# Patient Record
Sex: MALE | Race: BLACK OR AFRICAN AMERICAN | Employment: UNEMPLOYED | URBAN - METROPOLITAN AREA
[De-identification: names, ages, dates, MRNs, and addresses within clinical notes are randomized per-mention and may not be internally consistent; named-entity substitution may affect disease eponyms.]

---

## 2021-07-08 RX ORDER — HYDRALAZINE HYDROCHLORIDE 50 MG/1
50 TABLET, FILM COATED ORAL 3 TIMES DAILY
COMMUNITY

## 2021-07-08 RX ORDER — HYDROXYZINE 25 MG/1
25 TABLET, FILM COATED ORAL
COMMUNITY

## 2021-07-08 RX ORDER — AMLODIPINE BESYLATE 10 MG/1
10 TABLET ORAL EVERY EVENING
COMMUNITY

## 2021-07-08 RX ORDER — CLONIDINE HYDROCHLORIDE 0.2 MG/1
0.2 TABLET ORAL 3 TIMES DAILY
COMMUNITY

## 2021-07-08 RX ORDER — PRAVASTATIN SODIUM 10 MG/1
10 TABLET ORAL
COMMUNITY

## 2021-07-08 RX ORDER — FUROSEMIDE 80 MG/1
80 TABLET ORAL DAILY
COMMUNITY

## 2021-07-08 NOTE — PROGRESS NOTES
46- Dr. Axel Hess office called spoke with Leia Omer, office staff made aware that patient stated during phone assessment he knew he had an appointment for a cardiac cath but he is unsure of why he is having the procedure. She stated she will send a message through to the nurse Lorin Mosley) and have them call the patient to answer his questions. Stated will call PAT with any further information after speaking with the patient. Anabel Beyer from Dr. Axel Hess office called as well made aware of above information she stated she will call the patient. Patient was informed during phone assessment that the office would reach out to him to answer his questions.

## 2021-08-05 ENCOUNTER — HOSPITAL ENCOUNTER (OUTPATIENT)
Age: 51
Discharge: HOME | End: 2021-08-05
Attending: INTERNAL MEDICINE | Admitting: INTERNAL MEDICINE
Payer: MEDICARE

## 2021-08-05 VITALS
RESPIRATION RATE: 16 BRPM | SYSTOLIC BLOOD PRESSURE: 147 MMHG | OXYGEN SATURATION: 99 % | HEART RATE: 85 BPM | DIASTOLIC BLOOD PRESSURE: 92 MMHG | TEMPERATURE: 98.1 F | BODY MASS INDEX: 24.64 KG/M2 | WEIGHT: 192 LBS | HEIGHT: 74 IN

## 2021-08-05 DIAGNOSIS — R94.39 ABNORMAL STRESS ECG: ICD-10-CM

## 2021-08-05 LAB
ALBUMIN SERPL-MCNC: 3.7 G/DL (ref 3.5–5)
ALBUMIN/GLOB SERPL: 0.7 {RATIO} (ref 1.1–2.2)
ALP SERPL-CCNC: 99 U/L (ref 45–117)
ALT SERPL-CCNC: 17 U/L (ref 12–78)
ANION GAP SERPL CALC-SCNC: 4 MMOL/L (ref 5–15)
APTT PPP: 28.6 SEC (ref 21.2–34.1)
AST SERPL W P-5'-P-CCNC: 17 U/L (ref 15–37)
ATRIAL RATE: 81 BPM
BILIRUB SERPL-MCNC: 0.5 MG/DL (ref 0.2–1)
BUN SERPL-MCNC: 23 MG/DL (ref 6–20)
BUN/CREAT SERPL: 5 (ref 12–20)
CA-I BLD-MCNC: 9.9 MG/DL (ref 8.5–10.1)
CALCULATED P AXIS, ECG09: 48 DEGREES
CALCULATED R AXIS, ECG10: -8 DEGREES
CALCULATED T AXIS, ECG11: 20 DEGREES
CHLORIDE SERPL-SCNC: 101 MMOL/L (ref 97–108)
CO2 SERPL-SCNC: 33 MMOL/L (ref 21–32)
CREAT SERPL-MCNC: 5.1 MG/DL (ref 0.7–1.3)
DIAGNOSIS, 93000: NORMAL
ERYTHROCYTE [DISTWIDTH] IN BLOOD BY AUTOMATED COUNT: 17.4 % (ref 11.5–14.5)
GLOBULIN SER CALC-MCNC: 5 G/DL (ref 2–4)
GLUCOSE SERPL-MCNC: 99 MG/DL (ref 65–100)
HCT VFR BLD AUTO: 32.4 % (ref 36.6–50.3)
HGB BLD-MCNC: 10.8 G/DL (ref 12.1–17)
INR PPP: 0.9 (ref 0.9–1.1)
MCH RBC QN AUTO: 25.9 PG (ref 26–34)
MCHC RBC AUTO-ENTMCNC: 33.3 G/DL (ref 30–36.5)
MCV RBC AUTO: 77.7 FL (ref 80–99)
NRBC # BLD: 0 K/UL (ref 0–0.01)
NRBC BLD-RTO: 0 PER 100 WBC
P-R INTERVAL, ECG05: 184 MS
PLATELET # BLD AUTO: 186 K/UL (ref 150–400)
POTASSIUM SERPL-SCNC: 4.4 MMOL/L (ref 3.5–5.1)
PROT SERPL-MCNC: 8.7 G/DL (ref 6.4–8.2)
PROTHROMBIN TIME: 12 SEC (ref 11.9–14.7)
Q-T INTERVAL, ECG07: 390 MS
QRS DURATION, ECG06: 96 MS
QTC CALCULATION (BEZET), ECG08: 453 MS
RBC # BLD AUTO: 4.17 M/UL (ref 4.1–5.7)
SODIUM SERPL-SCNC: 138 MMOL/L (ref 136–145)
THERAPEUTIC RANGE,PTTT: NORMAL SEC (ref 82–109)
VENTRICULAR RATE, ECG03: 81 BPM
WBC # BLD AUTO: 6.5 K/UL (ref 4.1–11.1)

## 2021-08-05 PROCEDURE — 93005 ELECTROCARDIOGRAM TRACING: CPT

## 2021-08-05 PROCEDURE — 36415 COLL VENOUS BLD VENIPUNCTURE: CPT

## 2021-08-05 PROCEDURE — 85610 PROTHROMBIN TIME: CPT

## 2021-08-05 PROCEDURE — 74011250636 HC RX REV CODE- 250/636: Performed by: INTERNAL MEDICINE

## 2021-08-05 PROCEDURE — 85730 THROMBOPLASTIN TIME PARTIAL: CPT

## 2021-08-05 PROCEDURE — 85027 COMPLETE CBC AUTOMATED: CPT

## 2021-08-05 PROCEDURE — 80053 COMPREHEN METABOLIC PANEL: CPT

## 2021-08-05 RX ORDER — SODIUM CHLORIDE 9 MG/ML
30 INJECTION, SOLUTION INTRAVENOUS CONTINUOUS
Status: DISCONTINUED | OUTPATIENT
Start: 2021-08-05 | End: 2021-08-05 | Stop reason: HOSPADM

## 2021-08-05 RX ADMIN — SODIUM CHLORIDE 30 ML/HR: 9 INJECTION, SOLUTION INTRAVENOUS at 12:15

## 2021-09-13 ENCOUNTER — HOSPITAL ENCOUNTER (OUTPATIENT)
Dept: PREADMISSION TESTING | Age: 51
Discharge: HOME | End: 2021-09-13
Payer: MEDICARE

## 2021-09-13 VITALS
DIASTOLIC BLOOD PRESSURE: 83 MMHG | RESPIRATION RATE: 16 BRPM | TEMPERATURE: 97.9 F | OXYGEN SATURATION: 100 % | SYSTOLIC BLOOD PRESSURE: 136 MMHG | WEIGHT: 193.8 LBS | BODY MASS INDEX: 24.87 KG/M2 | HEIGHT: 74 IN | HEART RATE: 79 BPM

## 2021-09-13 LAB
ANION GAP SERPL CALC-SCNC: 4 MMOL/L (ref 5–15)
APTT PPP: 28.8 SEC (ref 21.2–34.1)
BUN SERPL-MCNC: 10 MG/DL (ref 6–20)
BUN/CREAT SERPL: 3 (ref 12–20)
CA-I BLD-MCNC: 9.3 MG/DL (ref 8.5–10.1)
CHLORIDE SERPL-SCNC: 100 MMOL/L (ref 97–108)
CO2 SERPL-SCNC: 33 MMOL/L (ref 21–32)
CREAT SERPL-MCNC: 3.51 MG/DL (ref 0.7–1.3)
ERYTHROCYTE [DISTWIDTH] IN BLOOD BY AUTOMATED COUNT: 16.8 % (ref 11.5–14.5)
GLUCOSE SERPL-MCNC: 87 MG/DL (ref 65–100)
HCT VFR BLD AUTO: 31.1 % (ref 36.6–50.3)
HGB BLD-MCNC: 10.6 G/DL (ref 12.1–17)
INR PPP: 0.9 (ref 0.9–1.1)
MCH RBC QN AUTO: 25.5 PG (ref 26–34)
MCHC RBC AUTO-ENTMCNC: 34.1 G/DL (ref 30–36.5)
MCV RBC AUTO: 74.9 FL (ref 80–99)
NRBC # BLD: 0 K/UL (ref 0–0.01)
NRBC BLD-RTO: 0 PER 100 WBC
PLATELET # BLD AUTO: 165 K/UL (ref 150–400)
POTASSIUM SERPL-SCNC: 4 MMOL/L (ref 3.5–5.1)
PROTHROMBIN TIME: 11.5 SEC (ref 11.9–14.7)
RBC # BLD AUTO: 4.15 M/UL (ref 4.1–5.7)
SODIUM SERPL-SCNC: 137 MMOL/L (ref 136–145)
THERAPEUTIC RANGE,PTTT: NORMAL SEC (ref 82–109)
WBC # BLD AUTO: 4.5 K/UL (ref 4.1–11.1)

## 2021-09-13 PROCEDURE — 85610 PROTHROMBIN TIME: CPT

## 2021-09-13 PROCEDURE — 80048 BASIC METABOLIC PNL TOTAL CA: CPT

## 2021-09-13 PROCEDURE — 85730 THROMBOPLASTIN TIME PARTIAL: CPT

## 2021-09-13 PROCEDURE — 36415 COLL VENOUS BLD VENIPUNCTURE: CPT

## 2021-09-13 PROCEDURE — 85027 COMPLETE CBC AUTOMATED: CPT

## 2021-09-13 NOTE — PROGRESS NOTES
[facility]-[address] [place] [address] Cardiology, spoke with Steffany Uriostegui, listed all abnormal lab values, she states she would let NP at office know and call back with any new orders. No new orders at this time.

## 2021-09-16 ENCOUNTER — HOSPITAL ENCOUNTER (OUTPATIENT)
Age: 51
Discharge: HOME | End: 2021-09-16
Attending: INTERNAL MEDICINE | Admitting: INTERNAL MEDICINE
Payer: MEDICARE

## 2021-09-16 VITALS
SYSTOLIC BLOOD PRESSURE: 159 MMHG | HEIGHT: 74 IN | BODY MASS INDEX: 24.87 KG/M2 | DIASTOLIC BLOOD PRESSURE: 93 MMHG | WEIGHT: 193.8 LBS | RESPIRATION RATE: 18 BRPM | TEMPERATURE: 98.4 F | OXYGEN SATURATION: 100 % | HEART RATE: 77 BPM

## 2021-09-16 DIAGNOSIS — R07.89 OTHER CHEST PAIN: ICD-10-CM

## 2021-09-16 PROBLEM — R06.02 SOB (SHORTNESS OF BREATH): Status: ACTIVE | Noted: 2021-09-16

## 2021-09-16 LAB
APPEARANCE UR: CLEAR
BACTERIA URNS QL MICRO: NEGATIVE /HPF
BILIRUB UR QL: NEGATIVE
COLOR UR: ABNORMAL
GLUCOSE UR STRIP.AUTO-MCNC: NEGATIVE MG/DL
HGB UR QL STRIP: ABNORMAL
KETONES UR QL STRIP.AUTO: NEGATIVE MG/DL
LEUKOCYTE ESTERASE UR QL STRIP.AUTO: NEGATIVE
NITRITE UR QL STRIP.AUTO: NEGATIVE
PH UR STRIP: 8 [PH] (ref 5–8)
PROT UR STRIP-MCNC: 30 MG/DL
RBC #/AREA URNS HPF: ABNORMAL /HPF (ref 0–5)
SP GR UR REFRACTOMETRY: <1.005 (ref 1–1.03)
UA: UC IF INDICATED,UAUC: ABNORMAL
UROBILINOGEN UR QL STRIP.AUTO: 0.1 EU/DL (ref 0.1–1)
WBC URNS QL MICRO: ABNORMAL /HPF (ref 0–4)

## 2021-09-16 PROCEDURE — 76210000021 HC REC RM PH II 0.5 TO 1 HR: Performed by: INTERNAL MEDICINE

## 2021-09-16 PROCEDURE — 77030019569 HC BND COMPR RAD TERU -B: Performed by: INTERNAL MEDICINE

## 2021-09-16 PROCEDURE — 77030019698 HC SYR ANGI MDLON MRTM -A: Performed by: INTERNAL MEDICINE

## 2021-09-16 PROCEDURE — 74011000250 HC RX REV CODE- 250: Performed by: INTERNAL MEDICINE

## 2021-09-16 PROCEDURE — 2709999900 HC NON-CHARGEABLE SUPPLY: Performed by: INTERNAL MEDICINE

## 2021-09-16 PROCEDURE — 81001 URINALYSIS AUTO W/SCOPE: CPT

## 2021-09-16 PROCEDURE — C1894 INTRO/SHEATH, NON-LASER: HCPCS | Performed by: INTERNAL MEDICINE

## 2021-09-16 PROCEDURE — 74011000636 HC RX REV CODE- 636: Performed by: INTERNAL MEDICINE

## 2021-09-16 PROCEDURE — 77030016699 HC CATH ANGI DX INFN1 CARD -A: Performed by: INTERNAL MEDICINE

## 2021-09-16 PROCEDURE — 99152 MOD SED SAME PHYS/QHP 5/>YRS: CPT | Performed by: INTERNAL MEDICINE

## 2021-09-16 PROCEDURE — C1769 GUIDE WIRE: HCPCS | Performed by: INTERNAL MEDICINE

## 2021-09-16 PROCEDURE — 93458 L HRT ARTERY/VENTRICLE ANGIO: CPT | Performed by: INTERNAL MEDICINE

## 2021-09-16 PROCEDURE — 76937 US GUIDE VASCULAR ACCESS: CPT | Performed by: INTERNAL MEDICINE

## 2021-09-16 PROCEDURE — 76210000017 HC OR PH I REC 1.5 TO 2 HR: Performed by: INTERNAL MEDICINE

## 2021-09-16 PROCEDURE — 74011250636 HC RX REV CODE- 250/636: Performed by: INTERNAL MEDICINE

## 2021-09-16 RX ORDER — HEPARIN SODIUM 200 [USP'U]/100ML
INJECTION, SOLUTION INTRAVENOUS
Status: COMPLETED | OUTPATIENT
Start: 2021-09-16 | End: 2021-09-16

## 2021-09-16 RX ORDER — LIDOCAINE HYDROCHLORIDE 10 MG/ML
INJECTION INFILTRATION; PERINEURAL AS NEEDED
Status: DISCONTINUED | OUTPATIENT
Start: 2021-09-16 | End: 2021-09-16 | Stop reason: HOSPADM

## 2021-09-16 RX ORDER — HYDRALAZINE HYDROCHLORIDE 20 MG/ML
10 INJECTION INTRAMUSCULAR; INTRAVENOUS
Status: COMPLETED | OUTPATIENT
Start: 2021-09-16 | End: 2021-09-16

## 2021-09-16 RX ORDER — MIDAZOLAM HYDROCHLORIDE 1 MG/ML
INJECTION INTRAMUSCULAR; INTRAVENOUS AS NEEDED
Status: DISCONTINUED | OUTPATIENT
Start: 2021-09-16 | End: 2021-09-16 | Stop reason: HOSPADM

## 2021-09-16 RX ORDER — MORPHINE SULFATE 2 MG/ML
INJECTION, SOLUTION INTRAMUSCULAR; INTRAVENOUS AS NEEDED
Status: DISCONTINUED | OUTPATIENT
Start: 2021-09-16 | End: 2021-09-16 | Stop reason: HOSPADM

## 2021-09-16 RX ORDER — HEPARIN SODIUM 1000 [USP'U]/ML
INJECTION, SOLUTION INTRAVENOUS; SUBCUTANEOUS AS NEEDED
Status: DISCONTINUED | OUTPATIENT
Start: 2021-09-16 | End: 2021-09-16 | Stop reason: HOSPADM

## 2021-09-16 RX ORDER — SODIUM CHLORIDE 0.9 % (FLUSH) 0.9 %
5-40 SYRINGE (ML) INJECTION AS NEEDED
Status: DISCONTINUED | OUTPATIENT
Start: 2021-09-16 | End: 2021-09-16 | Stop reason: HOSPADM

## 2021-09-16 RX ORDER — SODIUM CHLORIDE 0.9 % (FLUSH) 0.9 %
5-40 SYRINGE (ML) INJECTION EVERY 8 HOURS
Status: DISCONTINUED | OUTPATIENT
Start: 2021-09-16 | End: 2021-09-16 | Stop reason: HOSPADM

## 2021-09-16 RX ORDER — VERAPAMIL HYDROCHLORIDE 2.5 MG/ML
INJECTION, SOLUTION INTRAVENOUS AS NEEDED
Status: DISCONTINUED | OUTPATIENT
Start: 2021-09-16 | End: 2021-09-16 | Stop reason: HOSPADM

## 2021-09-16 RX ORDER — NITROGLYCERIN 5 MG/ML
INJECTION, SOLUTION INTRAVENOUS AS NEEDED
Status: DISCONTINUED | OUTPATIENT
Start: 2021-09-16 | End: 2021-09-16 | Stop reason: HOSPADM

## 2021-09-16 RX ORDER — SODIUM CHLORIDE 9 MG/ML
30 INJECTION, SOLUTION INTRAVENOUS CONTINUOUS
Status: DISCONTINUED | OUTPATIENT
Start: 2021-09-16 | End: 2021-09-16 | Stop reason: HOSPADM

## 2021-09-16 RX ADMIN — SODIUM CHLORIDE 30 ML/HR: 9 INJECTION, SOLUTION INTRAVENOUS at 08:42

## 2021-09-16 RX ADMIN — HYDRALAZINE HYDROCHLORIDE 10 MG: 20 INJECTION INTRAMUSCULAR; INTRAVENOUS at 11:02

## 2021-09-16 NOTE — DISCHARGE INSTRUCTIONS
Patient Education   Patient Education                    Cardiac Catheterization/Angiography Discharge Instructions    *Check the puncture site frequently for swelling or bleeding. If you see any bleeding, lie down and apply pressure over the area with a clean town or washcloth. Notify your doctor for any redness, swelling, drainage or oozing from the puncture site. Notify your doctor for any fever or chills. *If the leg or arm with the puncture becomes cold, numb or painful, call Dr Campos Breen at  [facility]    *Activity should be limited for the next 48 hours. Climb stairs as little as possible and avoid any stooping, bending or strenuous activity for 48 hours. No heavy lifting (anything over 10 pounds) for three days. *Do not drive for 48 hours. *You may resume your usual diet. Drink more fluids than usual.    *Have a responsible person drive you home and stay with you for at least 24 hours after your heart catheterization/angiography. *You may remove the bandage from your {ARM/GROIN:60447} in 24 hours. You may shower in 24 hours. No tub baths, hot tubs or swimming for one week. Do not place any lotions, creams, powders, ointments over the puncture site for one week. You may place a clean band-aid over the puncture site each day for 5 days. Change this daily. Learning About Monitored Anesthesia Care (MAC)  What is monitored anesthesia care? Monitored anesthesia care (MAC) means that an anesthesia specialist will care for you during your surgery. He or she will make sure that you get only the level of anesthesia you need to prevent pain for your specific case. MAC can be used instead of always giving general or regional anesthesia. MAC is often combined with local anesthesia for smaller procedures. That means you will be numb in the area being operated on. Sedation with MAC can make you feel sleepy and relaxed.  A doctor or a nurse who is trained to give anesthesia will closely watch you during surgery. He or she may adjust the medicine if needed so that you stay safe and comfortable. You may not remember much about the surgery after. There may be fewer side effects than with other types of anesthesia. It can also help you recover more quickly. What happens before surgery? An anesthesia specialist will talk to you before the surgery. He or she might ask about your health, past surgeries, medicine you take, and your family history, and your feelings about the surgery. He or she will help you decide if MAC is right for you. You will get an IV, which lets medicine into your vein through a tube. You may get:  · A sedative or anxiety medicine to help you relax. · Pain medicine. It can prevent pain during the surgery. You might also get a shot to make the area near the surgery numb. You will get a list of things to do to help you prepare for your surgery. You will learn about when to stop eating and drinking. If you take medicine, you will learn what you can and can't take before surgery. You will be asked to sign a form that says you understand the risks of anesthesia. Before you sign it, your doctor will talk with you about sedation with MAC. He or she may talk about other types of anesthesia as well. You will learn about the risks and benefits of each type. You may be told that the doctor may need to change from MAC to general anesthesia during surgery to keep you comfortable and safe. Many people are nervous before they have surgery. Ask your doctor about ways to relax. These may include relaxation exercises or medicine. What are the risks of anesthesia? Major side effects aren't common. But all types of anesthesia have some risk. Your risk depends on your overall health. It also depends on the type of anesthesia you have and how you respond to it. Serious but rare risks include breathing problems, heart attack, stroke, and a bad reaction to the medicine.  Some health conditions increase the risk of problems. Your doctor will find out about any health problems you have that may affect your care. Your doctor or nurse will closely watch your vital signs during surgery. This includes checking your breathing, blood pressure, and heart rate. This may help you avoid problems from anesthesia. What can you expect after having MAC? · Right after the surgery, you will be in the recovery room. Nurses will make sure you are safe and comfortable. · You may feel some of the effects of sedation with MAC for several hours. ? If you had local or regional anesthesia, you may feel numb and have less feeling in part of your body. It may also take a few hours for you to be able to move and control your muscles as usual.  ? If you had local anesthesia along with MAC, you may feel some pain and discomfort as the anesthetic wears off. Tell someone if you have pain. Pain medicine works better if you take it before the pain gets bad.  ? If your sedation with MAC was switched to general anesthesia during surgery, you may be confused. Or it may be hard to think clearly. This is normal. It may take some time before the effects are completely gone. · For minor surgeries, you may go home the same day. For other surgeries, you may stay in the hospital. Your doctor will check on your recovery from the anesthesia. Your doctor will answer any questions you may have. · Don't do anything for 24 hours that requires attention to detail. This includes going to work or school, making important decisions, and signing any legal documents. It takes time for the medicine effects to completely wear off. · For your safety, do not drive or operate any machinery that could be dangerous until the medicine wears off and you can think clearly and react easily. Follow-up care is a key part of your treatment and safety. Be sure to make and go to all appointments, and call your doctor if you are having problems.  It's also a good idea to know your test results and keep a list of the medicines you take. Where can you learn more? Go to http://www.gray.com/  Enter R730 in the search box to learn more about \"Learning About Monitored Anesthesia Care (MAC). \"  Current as of: February 11, 2021               Content Version: 13.0  © 4407-1880 [employer]. Care instructions adapted under license by [employer] (which disclaims liability or warranty for this information). If you have questions about a medical condition or this instruction, always ask your healthcare professional. [address] any warranty or liability for your use of this information. Angiogram: What to Expect at Home  Your Recovery  An angiogram is an X-ray test that uses dye and a camera to take pictures of the blood flow in an artery or a vein. The doctor inserted a thin, flexible tube (catheter) into a blood vessel in your groin. In some cases, the catheter is placed in a blood vessel in the arm. An angiogram is done for many reasons. For example, you may have an angiogram to find the source of bleeding, such as an ulcer. Or it may be done to look for blocked blood vessels in your lungs. After an angiogram, your groin or arm may have a bruise and feel sore for a day or two. You can do light activities around the house but nothing strenuous for several days. Your doctor may give you specific instructions on when you can do your normal activities again, such as driving and going back to work. This care sheet gives you a general idea about how long it will take for you to recover. But each person recovers at a different pace. Follow the steps below to feel better as quickly as possible. How can you care for yourself at home? Activity    · Do not do strenuous exercise and do not lift, pull, or push anything heavy until your doctor says it is okay. This may be for a day or two.  You can walk around the house and do light activity, such as cooking.     · If the catheter was placed in your groin, try not to walk up stairs for the first couple of days.     · If the catheter was placed in your arm near your wrist, do not bend your wrist deeply for the first couple of days. Be careful using your hand to get into and out of a chair or bed.     · If your doctor recommends it, get more exercise. Walking is a good choice. Bit by bit, increase the amount you walk every day. Try for at least 30 minutes on most days of the week. Diet    · Drink plenty of fluids to help your body flush out the dye. If you have kidney, heart, or liver disease and have to limit fluids, talk with your doctor before you increase the amount of fluids you drink.     · You can eat your normal diet. If your stomach is upset, try bland, low-fat foods like plain rice, broiled chicken, toast, and yogurt. Medicines    · Be safe with medicines. Read and follow all instructions on the label. ? If the doctor gave you a prescription medicine for pain, take it as prescribed. ? If you are not taking a prescription pain medicine, ask your doctor if you can take an over-the-counter medicine.     · If you take aspirin or some other blood thinner, ask your doctor if and when to start taking it again. Make sure that you understand exactly what your doctor wants you to do.     · Your doctor will tell you if and when you can restart your medicines. He or she will also give you instructions about taking any new medicines. Care of the catheter site    · You will have a dressing over the cut (incision). A dressing helps the incision heal and protects it. Your doctor will tell you how to take care of this.     · Put ice or a cold pack on the area for 10 to 20 minutes at a time to help with soreness or swelling. Put a thin cloth between the ice and your skin.     · You may shower 24 to 48 hours after the procedure, if your doctor okays it.  Pat the incision dry.     · Do not soak the catheter site until it is healed. Don't take a bath for 1 week, or until your doctor tells you it is okay.     · Watch for bleeding from the site. A small amount of blood (up to the size of a quarter) on the bandage can be normal.     · If you are bleeding, lie down and press on the area for 15 minutes to try to make it stop. If the bleeding does not stop, call your doctor or seek immediate medical care. Follow-up care is a key part of your treatment and safety. Be sure to make and go to all appointments, and call your doctor if you are having problems. It's also a good idea to know your test results and keep a list of the medicines you take. When should you call for help? Call 911 anytime you think you may need emergency care. For example, call if:    · You passed out (lost consciousness).     · You have severe trouble breathing.     · You have sudden chest pain and shortness of breath, or you cough up blood. Call your doctor now or seek immediate medical care if:    · You are bleeding from the area where the catheter was put in your artery.     · You have a fast-growing, painful lump at the catheter site.     · You have signs of infection, such as:  ? Increased pain, swelling, warmth, or redness. ? Red streaks leading from the incision. ? Pus draining from the incision. ? A fever. Watch closely for any changes in your health, and be sure to contact your doctor if:    · You don't get better as expected. Where can you learn more? Go to http://www.gray.com/  Enter [id number] in the search box to learn more about \"Angiogram: What to Expect at Home. \"  Current as of: June 17, 2021               Content Version: 13.0  © 9149-7840 [employer]. Care instructions adapted under license by [employer] (which disclaims liability or warranty for this information).  If you have questions about a medical condition or this instruction, always ask your healthcare professional. [address] any warranty or liability for your use of this information.

## 2021-09-16 NOTE — Clinical Note
TRANSFER - OUT REPORT:     Verbal report given to: BONIFACIO Wallace, (at bedside). Report consisted of patient's Situation, Background, Assessment and   Recommendations(SBAR). Opportunity for questions and clarification was provided. Patient transported with a Registered Nurse. Patient transported to: PACU.

## 2021-09-16 NOTE — Clinical Note
Contrast Dose Calculator:   Patient's age: 53.   Patient's sex: Male. Patient weight (kg) = 87.9. Creatinine level (mg/dL) = 3.51. Creatinine clearance (mL/min): 32.   Contrast concentration (mg/mL) = 370. MACD = 101.52 mL. Max Contrast dose per Creatinine Cl calculator = 72 mL.

## 2021-09-16 NOTE — PROGRESS NOTES
DC instructions reviewed with pt. Verb an understanding. Rt wrist remains with dry dressing. 2+radial pulse. Pt offers no complaints.

## 2022-03-18 PROBLEM — R94.39 ABNORMAL CARDIOVASCULAR STRESS TEST: Status: ACTIVE | Noted: 2021-08-05

## 2022-03-19 PROBLEM — R06.02 SOB (SHORTNESS OF BREATH): Status: ACTIVE | Noted: 2021-09-16

## 2023-05-15 RX ORDER — PRAVASTATIN SODIUM 10 MG
10 TABLET ORAL NIGHTLY
COMMUNITY

## 2023-05-15 RX ORDER — CLONIDINE HYDROCHLORIDE 0.2 MG/1
0.2 TABLET ORAL 3 TIMES DAILY
COMMUNITY

## 2023-05-15 RX ORDER — FUROSEMIDE 80 MG
80 TABLET ORAL DAILY
COMMUNITY

## 2023-05-15 RX ORDER — AMLODIPINE BESYLATE 10 MG/1
10 TABLET ORAL EVERY EVENING
COMMUNITY

## 2023-05-15 RX ORDER — HYDROXYZINE HYDROCHLORIDE 25 MG/1
25 TABLET, FILM COATED ORAL NIGHTLY
COMMUNITY

## 2023-05-15 RX ORDER — HYDRALAZINE HYDROCHLORIDE 50 MG/1
50 TABLET, FILM COATED ORAL 3 TIMES DAILY
COMMUNITY

## (undated) DEVICE — 48" PROBE COVER W/GEL, ULTRASOUND, STERILE: Brand: SITE-RITE

## (undated) DEVICE — SURGICAL PROCEDURE TRAY CRD CATH 3 PRT

## (undated) DEVICE — SYRINGE MED 10ML PUR GAM COMPATIBLE POLYCARB FIX M LUER CONN

## (undated) DEVICE — CATHETER ANGIO 5FR L100CM GRY S STL NYL JL3.5 3 SEG BRAID L

## (undated) DEVICE — LULU RADIAL/FEMORAL ANGIOGRAPHY SHEET: Brand: CONVERTORS

## (undated) DEVICE — TUBING PRESS INJ FLX SH 30IN --

## (undated) DEVICE — CATH IV AUTOGRD PNK 20GA 25MM -- INSYTE-N

## (undated) DEVICE — GLIDESHEATH SLENDER ACCESS KIT: Brand: GLIDESHEATH SLENDER

## (undated) DEVICE — SYR 20ML LL STRL LF --

## (undated) DEVICE — WASTEBAG DRIP/ADAPTER: Brand: MEDLINE INDUSTRIES, INC.

## (undated) DEVICE — 3M™ TEGADERM™ TRANSPARENT FILM DRESSING FRAME STYLE, 1626W, 4 IN X 4-3/4 IN (10 CM X 12 CM), 50/CT 4CT/CASE: Brand: 3M™ TEGADERM™

## (undated) DEVICE — BOWL UTIL 16OZ STRL --

## (undated) DEVICE — GUIDEWIRE VASC L260CM DIA0.035IN TIP L3MM STD EXCHG PTFE J

## (undated) DEVICE — SYRINGE MED 10ML RED POLYCARB BRL FIX M LUER CONN FLAT GRP

## (undated) DEVICE — TR BAND RADIAL ARTERY COMPRESSION DEVICE: Brand: TR BAND